# Patient Record
Sex: FEMALE | Race: BLACK OR AFRICAN AMERICAN | NOT HISPANIC OR LATINO | URBAN - METROPOLITAN AREA
[De-identification: names, ages, dates, MRNs, and addresses within clinical notes are randomized per-mention and may not be internally consistent; named-entity substitution may affect disease eponyms.]

---

## 2021-01-13 ENCOUNTER — EMERGENCY (EMERGENCY)
Facility: HOSPITAL | Age: 41
LOS: 1 days | Discharge: HOME | End: 2021-01-13
Admitting: EMERGENCY MEDICINE
Payer: COMMERCIAL

## 2021-01-13 VITALS
DIASTOLIC BLOOD PRESSURE: 77 MMHG | HEART RATE: 96 BPM | RESPIRATION RATE: 16 BRPM | SYSTOLIC BLOOD PRESSURE: 119 MMHG | OXYGEN SATURATION: 95 % | TEMPERATURE: 98 F

## 2021-01-13 DIAGNOSIS — Z20.822 CONTACT WITH AND (SUSPECTED) EXPOSURE TO COVID-19: ICD-10-CM

## 2021-01-13 DIAGNOSIS — R09.81 NASAL CONGESTION: ICD-10-CM

## 2021-01-13 LAB — SARS-COV-2 RNA SPEC QL NAA+PROBE: SIGNIFICANT CHANGE UP

## 2021-01-13 PROCEDURE — U0003: CPT

## 2021-01-13 PROCEDURE — U0005: CPT

## 2021-01-13 PROCEDURE — 99283 EMERGENCY DEPT VISIT LOW MDM: CPT

## 2021-01-13 PROCEDURE — 99284 EMERGENCY DEPT VISIT MOD MDM: CPT

## 2021-01-13 NOTE — ED PROVIDER NOTE - PATIENT PORTAL LINK FT
You can access the FollowMyHealth Patient Portal offered by [facility] by registering at the following website: [email or web address]. By joining [employer]’s FollowMyHealth portal, you will also be able to view your health information using other applications (apps) compatible with our system.

## 2021-01-13 NOTE — ED PROVIDER NOTE - NS ED ROS FT
CONSTITUTIONAL:  No fever or chills, no bodyaches  HEENT:  No sore throat or headache, +nasal congestion  PULM:  No cough or shortness of breath  GI:  No diarrhea, no vomiting

## 2021-01-13 NOTE — ED PROVIDER NOTE - OBJECTIVE STATEMENT
Patient is presenting to the Emergency Department with a request to have COVID-19 testing. Pt has runny nose. Denies symptoms, including cough, shortness of breath, fever, chills, bodyaches or sore throat.

## 2021-03-25 ENCOUNTER — EMERGENCY (EMERGENCY)
Facility: HOSPITAL | Age: 41
LOS: 1 days | Discharge: HOME | End: 2021-03-25
Admitting: EMERGENCY MEDICINE
Payer: COMMERCIAL

## 2021-03-25 VITALS
OXYGEN SATURATION: 96 % | HEART RATE: 71 BPM | DIASTOLIC BLOOD PRESSURE: 84 MMHG | RESPIRATION RATE: 18 BRPM | TEMPERATURE: 98 F | SYSTOLIC BLOOD PRESSURE: 124 MMHG

## 2021-03-25 LAB — SARS-COV-2 RNA SPEC QL NAA+PROBE: SIGNIFICANT CHANGE UP

## 2021-03-25 PROCEDURE — 99284 EMERGENCY DEPT VISIT MOD MDM: CPT

## 2021-03-25 PROCEDURE — 99283 EMERGENCY DEPT VISIT LOW MDM: CPT

## 2021-03-25 PROCEDURE — U0003: CPT

## 2021-03-25 PROCEDURE — U0005: CPT

## 2021-03-25 NOTE — ED PROVIDER NOTE - CLINICAL SUMMARY MEDICAL DECISION MAKING FREE TEXT BOX
Patient is presenting to the Emergency Department and requesting a COVID-19 test.  states runny nose patient denies any other symptoms, has an unremarkable focused exam and looks well.  Nasopharyngeal PCR has been obtained and patient has been guided on how to obtain their results.  General COVID-19 discharge instructions have been given to the patient.

## 2021-03-25 NOTE — ED PROVIDER NOTE - NS ED ROS FT
CONSTITUTIONAL:  No fever or chills, no bodyaches  HEENT: runny nose. No sore throat or headache, no nasal congestion  PULM:  No cough or shortness of breath  GI:  No diarrhea, no vomiting

## 2021-03-25 NOTE — ED PROVIDER NOTE - PATIENT PORTAL LINK FT
You can access the FollowMyHealth Patient Portal offered by [facility] by registering at the following website: [email or web address]. By joining [employer]’s FollowMyHealth portal, you will also be able to view your health information using other applications (apps) compatible with our system.

## 2021-03-28 DIAGNOSIS — R09.89 OTHER SPECIFIED SYMPTOMS AND SIGNS INVOLVING THE CIRCULATORY AND RESPIRATORY SYSTEMS: ICD-10-CM

## 2021-03-28 DIAGNOSIS — Z20.822 CONTACT WITH AND (SUSPECTED) EXPOSURE TO COVID-19: ICD-10-CM

## 2023-08-01 ENCOUNTER — EMERGENCY (EMERGENCY)
Facility: HOSPITAL | Age: 43
LOS: 1 days | Discharge: HOME | End: 2023-08-01
Attending: EMERGENCY MEDICINE | Admitting: OBSTETRICS & GYNECOLOGY
Payer: COMMERCIAL

## 2023-08-01 VITALS
DIASTOLIC BLOOD PRESSURE: 82 MMHG | OXYGEN SATURATION: 100 % | SYSTOLIC BLOOD PRESSURE: 123 MMHG | RESPIRATION RATE: 18 BRPM | HEIGHT: 65 IN | HEART RATE: 81 BPM | TEMPERATURE: 98 F | WEIGHT: 147.93 LBS

## 2023-08-01 DIAGNOSIS — O36.8120 DECREASED FETAL MOVEMENTS, SECOND TRIMESTER, NOT APPLICABLE OR UNSPECIFIED: ICD-10-CM

## 2023-08-01 DIAGNOSIS — R10.9 UNSPECIFIED ABDOMINAL PAIN: ICD-10-CM

## 2023-08-01 DIAGNOSIS — Z3A.21 21 WEEKS GESTATION OF PREGNANCY: ICD-10-CM

## 2023-08-01 DIAGNOSIS — O09.522 SUPERVISION OF ELDERLY MULTIGRAVIDA, SECOND TRIMESTER: ICD-10-CM

## 2023-08-01 DIAGNOSIS — O99.891 OTHER SPECIFIED DISEASES AND CONDITIONS COMPLICATING PREGNANCY: ICD-10-CM

## 2023-08-01 LAB
ALBUMIN SERPL ELPH-MCNC: 3.1 G/DL — LOW (ref 3.3–5)
ALP SERPL-CCNC: 61 U/L — SIGNIFICANT CHANGE UP (ref 40–120)
ALT FLD-CCNC: 48 U/L — HIGH (ref 10–45)
AMYLASE P1 CFR SERPL: 84 U/L — SIGNIFICANT CHANGE UP (ref 25–125)
ANION GAP SERPL CALC-SCNC: 9 MMOL/L — SIGNIFICANT CHANGE UP (ref 5–17)
APPEARANCE UR: CLEAR — SIGNIFICANT CHANGE UP
AST SERPL-CCNC: 33 U/L — SIGNIFICANT CHANGE UP (ref 10–40)
BASOPHILS # BLD AUTO: 0.05 K/UL — SIGNIFICANT CHANGE UP (ref 0–0.2)
BASOPHILS NFR BLD AUTO: 0.4 % — SIGNIFICANT CHANGE UP (ref 0–2)
BILIRUB SERPL-MCNC: 0.2 MG/DL — SIGNIFICANT CHANGE UP (ref 0.2–1.2)
BILIRUB UR-MCNC: NEGATIVE — SIGNIFICANT CHANGE UP
BUN SERPL-MCNC: 7 MG/DL — SIGNIFICANT CHANGE UP (ref 7–23)
CALCIUM SERPL-MCNC: 8.6 MG/DL — SIGNIFICANT CHANGE UP (ref 8.4–10.5)
CHLORIDE SERPL-SCNC: 109 MMOL/L — HIGH (ref 96–108)
CO2 SERPL-SCNC: 23 MMOL/L — SIGNIFICANT CHANGE UP (ref 22–31)
COLOR SPEC: YELLOW — SIGNIFICANT CHANGE UP
CREAT ?TM UR-MCNC: 66 MG/DL — SIGNIFICANT CHANGE UP
CREAT SERPL-MCNC: 0.5 MG/DL — SIGNIFICANT CHANGE UP (ref 0.5–1.3)
DIFF PNL FLD: NEGATIVE — SIGNIFICANT CHANGE UP
EGFR: 119 ML/MIN/1.73M2 — SIGNIFICANT CHANGE UP
EOSINOPHIL # BLD AUTO: 0.12 K/UL — SIGNIFICANT CHANGE UP (ref 0–0.5)
EOSINOPHIL NFR BLD AUTO: 1.1 % — SIGNIFICANT CHANGE UP (ref 0–6)
GLUCOSE SERPL-MCNC: 73 MG/DL — SIGNIFICANT CHANGE UP (ref 70–99)
GLUCOSE UR QL: NEGATIVE — SIGNIFICANT CHANGE UP
HCT VFR BLD CALC: 28.9 % — LOW (ref 34.5–45)
HGB BLD-MCNC: 9.8 G/DL — LOW (ref 11.5–15.5)
IMM GRANULOCYTES NFR BLD AUTO: 1.1 % — HIGH (ref 0–0.9)
KETONES UR-MCNC: NEGATIVE — SIGNIFICANT CHANGE UP
LDH SERPL L TO P-CCNC: 162 U/L — SIGNIFICANT CHANGE UP (ref 50–242)
LEUKOCYTE ESTERASE UR-ACNC: NEGATIVE — SIGNIFICANT CHANGE UP
LIDOCAIN IGE QN: 22 U/L — SIGNIFICANT CHANGE UP (ref 7–60)
LYMPHOCYTES # BLD AUTO: 1.92 K/UL — SIGNIFICANT CHANGE UP (ref 1–3.3)
LYMPHOCYTES # BLD AUTO: 17 % — SIGNIFICANT CHANGE UP (ref 13–44)
MCHC RBC-ENTMCNC: 33.2 PG — SIGNIFICANT CHANGE UP (ref 27–34)
MCHC RBC-ENTMCNC: 33.9 GM/DL — SIGNIFICANT CHANGE UP (ref 32–36)
MCV RBC AUTO: 98 FL — SIGNIFICANT CHANGE UP (ref 80–100)
MONOCYTES # BLD AUTO: 0.87 K/UL — SIGNIFICANT CHANGE UP (ref 0–0.9)
MONOCYTES NFR BLD AUTO: 7.7 % — SIGNIFICANT CHANGE UP (ref 2–14)
NEUTROPHILS # BLD AUTO: 8.23 K/UL — HIGH (ref 1.8–7.4)
NEUTROPHILS NFR BLD AUTO: 72.7 % — SIGNIFICANT CHANGE UP (ref 43–77)
NITRITE UR-MCNC: NEGATIVE — SIGNIFICANT CHANGE UP
NRBC # BLD: 0 /100 WBCS — SIGNIFICANT CHANGE UP (ref 0–0)
PH UR: 7 — SIGNIFICANT CHANGE UP (ref 5–8)
PLATELET # BLD AUTO: 310 K/UL — SIGNIFICANT CHANGE UP (ref 150–400)
POTASSIUM SERPL-MCNC: 3.4 MMOL/L — LOW (ref 3.5–5.3)
POTASSIUM SERPL-SCNC: 3.4 MMOL/L — LOW (ref 3.5–5.3)
PROT ?TM UR-MCNC: 8 MG/DL — SIGNIFICANT CHANGE UP (ref 0–12)
PROT SERPL-MCNC: 6.3 G/DL — SIGNIFICANT CHANGE UP (ref 6–8.3)
PROT UR-MCNC: NEGATIVE MG/DL — SIGNIFICANT CHANGE UP
PROT/CREAT UR-RTO: 0.1 RATIO — SIGNIFICANT CHANGE UP (ref 0–0.2)
RBC # BLD: 2.95 M/UL — LOW (ref 3.8–5.2)
RBC # FLD: 15 % — HIGH (ref 10.3–14.5)
SODIUM SERPL-SCNC: 141 MMOL/L — SIGNIFICANT CHANGE UP (ref 135–145)
SP GR SPEC: 1.02 — SIGNIFICANT CHANGE UP (ref 1–1.03)
URATE SERPL-MCNC: 2.5 MG/DL — SIGNIFICANT CHANGE UP (ref 2.5–7)
UROBILINOGEN FLD QL: 0.2 E.U./DL — SIGNIFICANT CHANGE UP
WBC # BLD: 11.31 K/UL — HIGH (ref 3.8–10.5)
WBC # FLD AUTO: 11.31 K/UL — HIGH (ref 3.8–10.5)

## 2023-08-01 PROCEDURE — 36415 COLL VENOUS BLD VENIPUNCTURE: CPT

## 2023-08-01 PROCEDURE — 76705 ECHO EXAM OF ABDOMEN: CPT

## 2023-08-01 PROCEDURE — 84550 ASSAY OF BLOOD/URIC ACID: CPT

## 2023-08-01 PROCEDURE — 82570 ASSAY OF URINE CREATININE: CPT

## 2023-08-01 PROCEDURE — 83690 ASSAY OF LIPASE: CPT

## 2023-08-01 PROCEDURE — 81003 URINALYSIS AUTO W/O SCOPE: CPT

## 2023-08-01 PROCEDURE — 83615 LACTATE (LD) (LDH) ENZYME: CPT

## 2023-08-01 PROCEDURE — 82150 ASSAY OF AMYLASE: CPT

## 2023-08-01 PROCEDURE — 85025 COMPLETE CBC W/AUTO DIFF WBC: CPT

## 2023-08-01 PROCEDURE — 99285 EMERGENCY DEPT VISIT HI MDM: CPT

## 2023-08-01 PROCEDURE — 76705 ECHO EXAM OF ABDOMEN: CPT | Mod: 26

## 2023-08-01 PROCEDURE — 80053 COMPREHEN METABOLIC PANEL: CPT

## 2023-08-01 PROCEDURE — 99285 EMERGENCY DEPT VISIT HI MDM: CPT | Mod: 25

## 2023-08-01 PROCEDURE — 84156 ASSAY OF PROTEIN URINE: CPT

## 2023-08-01 RX ORDER — ACETAMINOPHEN 500 MG
1000 TABLET ORAL ONCE
Refills: 0 | Status: DISCONTINUED | OUTPATIENT
Start: 2023-08-01 | End: 2023-08-01

## 2023-08-01 NOTE — ED PROVIDER NOTE - CLINICAL SUMMARY MEDICAL DECISION MAKING FREE TEXT BOX
43 F  @ 21 weeks by dates, due date Dec 15th, no with decreased fetal movement and intermittent R sided nonradiating sharp crampy abdominal pains feels like contractions.  Denies n/v, hematuria, flank pain, fever, chills, dysuria.  Spoke with OB  - recommend admit for further eval in OB.

## 2023-08-01 NOTE — ED PROVIDER NOTE - OBJECTIVE STATEMENT
43 F  @ 21 weeks by dates, due date Dec 15th, no with decreased fetal movement and intermittent R sided nonradiating sharp crampy abdominal pains feels like contractions.  Denies n/v, hematuria, flank pain, fever, chills, dysuria.

## 2023-12-04 ENCOUNTER — INPATIENT (INPATIENT)
Facility: HOSPITAL | Age: 43
LOS: 3 days | Discharge: HOME | End: 2023-12-08
Attending: OBSTETRICS & GYNECOLOGY | Admitting: OBSTETRICS & GYNECOLOGY
Payer: COMMERCIAL

## 2023-12-04 VITALS
RESPIRATION RATE: 16 BRPM | DIASTOLIC BLOOD PRESSURE: 74 MMHG | OXYGEN SATURATION: 99 % | TEMPERATURE: 98 F | SYSTOLIC BLOOD PRESSURE: 111 MMHG | HEART RATE: 83 BPM

## 2023-12-04 DIAGNOSIS — O26.899 OTHER SPECIFIED PREGNANCY RELATED CONDITIONS, UNSPECIFIED TRIMESTER: ICD-10-CM

## 2023-12-04 DIAGNOSIS — Z98.890 OTHER SPECIFIED POSTPROCEDURAL STATES: Chronic | ICD-10-CM

## 2023-12-04 LAB
BASOPHILS # BLD AUTO: 0.06 K/UL — SIGNIFICANT CHANGE UP (ref 0–0.2)
BASOPHILS # BLD AUTO: 0.06 K/UL — SIGNIFICANT CHANGE UP (ref 0–0.2)
BASOPHILS NFR BLD AUTO: 0.5 % — SIGNIFICANT CHANGE UP (ref 0–2)
BASOPHILS NFR BLD AUTO: 0.5 % — SIGNIFICANT CHANGE UP (ref 0–2)
BLD GP AB SCN SERPL QL: NEGATIVE — SIGNIFICANT CHANGE UP
BLD GP AB SCN SERPL QL: NEGATIVE — SIGNIFICANT CHANGE UP
EOSINOPHIL # BLD AUTO: 0.12 K/UL — SIGNIFICANT CHANGE UP (ref 0–0.5)
EOSINOPHIL # BLD AUTO: 0.12 K/UL — SIGNIFICANT CHANGE UP (ref 0–0.5)
EOSINOPHIL NFR BLD AUTO: 1 % — SIGNIFICANT CHANGE UP (ref 0–6)
EOSINOPHIL NFR BLD AUTO: 1 % — SIGNIFICANT CHANGE UP (ref 0–6)
HCT VFR BLD CALC: 31.6 % — LOW (ref 34.5–45)
HCT VFR BLD CALC: 31.6 % — LOW (ref 34.5–45)
HGB BLD-MCNC: 10.8 G/DL — LOW (ref 11.5–15.5)
HGB BLD-MCNC: 10.8 G/DL — LOW (ref 11.5–15.5)
IMM GRANULOCYTES NFR BLD AUTO: 1.2 % — HIGH (ref 0–0.9)
IMM GRANULOCYTES NFR BLD AUTO: 1.2 % — HIGH (ref 0–0.9)
LYMPHOCYTES # BLD AUTO: 19.7 % — SIGNIFICANT CHANGE UP (ref 13–44)
LYMPHOCYTES # BLD AUTO: 19.7 % — SIGNIFICANT CHANGE UP (ref 13–44)
LYMPHOCYTES # BLD AUTO: 2.27 K/UL — SIGNIFICANT CHANGE UP (ref 1–3.3)
LYMPHOCYTES # BLD AUTO: 2.27 K/UL — SIGNIFICANT CHANGE UP (ref 1–3.3)
MCHC RBC-ENTMCNC: 33.4 PG — SIGNIFICANT CHANGE UP (ref 27–34)
MCHC RBC-ENTMCNC: 33.4 PG — SIGNIFICANT CHANGE UP (ref 27–34)
MCHC RBC-ENTMCNC: 34.2 GM/DL — SIGNIFICANT CHANGE UP (ref 32–36)
MCHC RBC-ENTMCNC: 34.2 GM/DL — SIGNIFICANT CHANGE UP (ref 32–36)
MCV RBC AUTO: 97.8 FL — SIGNIFICANT CHANGE UP (ref 80–100)
MCV RBC AUTO: 97.8 FL — SIGNIFICANT CHANGE UP (ref 80–100)
MONOCYTES # BLD AUTO: 1.02 K/UL — HIGH (ref 0–0.9)
MONOCYTES # BLD AUTO: 1.02 K/UL — HIGH (ref 0–0.9)
MONOCYTES NFR BLD AUTO: 8.8 % — SIGNIFICANT CHANGE UP (ref 2–14)
MONOCYTES NFR BLD AUTO: 8.8 % — SIGNIFICANT CHANGE UP (ref 2–14)
NEUTROPHILS # BLD AUTO: 7.94 K/UL — HIGH (ref 1.8–7.4)
NEUTROPHILS # BLD AUTO: 7.94 K/UL — HIGH (ref 1.8–7.4)
NEUTROPHILS NFR BLD AUTO: 68.8 % — SIGNIFICANT CHANGE UP (ref 43–77)
NEUTROPHILS NFR BLD AUTO: 68.8 % — SIGNIFICANT CHANGE UP (ref 43–77)
NRBC # BLD: 0 /100 WBCS — SIGNIFICANT CHANGE UP (ref 0–0)
NRBC # BLD: 0 /100 WBCS — SIGNIFICANT CHANGE UP (ref 0–0)
PLATELET # BLD AUTO: 276 K/UL — SIGNIFICANT CHANGE UP (ref 150–400)
PLATELET # BLD AUTO: 276 K/UL — SIGNIFICANT CHANGE UP (ref 150–400)
RBC # BLD: 3.23 M/UL — LOW (ref 3.8–5.2)
RBC # BLD: 3.23 M/UL — LOW (ref 3.8–5.2)
RBC # FLD: 13.6 % — SIGNIFICANT CHANGE UP (ref 10.3–14.5)
RBC # FLD: 13.6 % — SIGNIFICANT CHANGE UP (ref 10.3–14.5)
RH IG SCN BLD-IMP: POSITIVE — SIGNIFICANT CHANGE UP
WBC # BLD: 11.55 K/UL — HIGH (ref 3.8–10.5)
WBC # BLD: 11.55 K/UL — HIGH (ref 3.8–10.5)
WBC # FLD AUTO: 11.55 K/UL — HIGH (ref 3.8–10.5)
WBC # FLD AUTO: 11.55 K/UL — HIGH (ref 3.8–10.5)

## 2023-12-04 PROCEDURE — 88307 TISSUE EXAM BY PATHOLOGIST: CPT | Mod: 26

## 2023-12-04 RX ORDER — SODIUM CHLORIDE 9 MG/ML
1000 INJECTION, SOLUTION INTRAVENOUS ONCE
Refills: 0 | Status: COMPLETED | OUTPATIENT
Start: 2023-12-04 | End: 2023-12-04

## 2023-12-04 RX ORDER — OXYTOCIN 10 UNIT/ML
333.33 VIAL (ML) INJECTION
Qty: 20 | Refills: 0 | Status: DISCONTINUED | OUTPATIENT
Start: 2023-12-04 | End: 2023-12-05

## 2023-12-04 RX ORDER — INFLUENZA VIRUS VACCINE 15; 15; 15; 15 UG/.5ML; UG/.5ML; UG/.5ML; UG/.5ML
0.5 SUSPENSION INTRAMUSCULAR ONCE
Refills: 0 | Status: DISCONTINUED | OUTPATIENT
Start: 2023-12-04 | End: 2023-12-08

## 2023-12-04 RX ORDER — FAMOTIDINE 10 MG/ML
20 INJECTION INTRAVENOUS ONCE
Refills: 0 | Status: COMPLETED | OUTPATIENT
Start: 2023-12-04 | End: 2023-12-04

## 2023-12-04 RX ORDER — AZITHROMYCIN 500 MG/1
500 TABLET, FILM COATED ORAL ONCE
Refills: 0 | Status: COMPLETED | OUTPATIENT
Start: 2023-12-04 | End: 2023-12-04

## 2023-12-04 RX ORDER — CEFAZOLIN SODIUM 1 G
2000 VIAL (EA) INJECTION ONCE
Refills: 0 | Status: COMPLETED | OUTPATIENT
Start: 2023-12-04 | End: 2023-12-04

## 2023-12-04 RX ORDER — SODIUM CHLORIDE 9 MG/ML
1000 INJECTION, SOLUTION INTRAVENOUS
Refills: 0 | Status: DISCONTINUED | OUTPATIENT
Start: 2023-12-04 | End: 2023-12-05

## 2023-12-04 RX ORDER — CITRIC ACID/SODIUM CITRATE 300-500 MG
30 SOLUTION, ORAL ORAL ONCE
Refills: 0 | Status: COMPLETED | OUTPATIENT
Start: 2023-12-04 | End: 2023-12-04

## 2023-12-04 RX ADMIN — Medication 30 MILLILITER(S): at 22:19

## 2023-12-04 RX ADMIN — FAMOTIDINE 20 MILLIGRAM(S): 10 INJECTION INTRAVENOUS at 22:19

## 2023-12-04 RX ADMIN — AZITHROMYCIN 255 MILLIGRAM(S): 500 TABLET, FILM COATED ORAL at 22:35

## 2023-12-04 RX ADMIN — SODIUM CHLORIDE 2000 MILLILITER(S): 9 INJECTION, SOLUTION INTRAVENOUS at 22:35

## 2023-12-04 NOTE — OB RN PATIENT PROFILE - PRO FEEDING PLAN INFANT OB
Addended by: RUSTY HAWTHORNE on: 8/14/2019 10:12 AM     Modules accepted: Orders     initiation of breastfeeding/breast milk feeding

## 2023-12-04 NOTE — OB PROVIDER H&P - HISTORY OF PRESENT ILLNESS
43y  at 38w4d presents for PROM at 17:00, clear fluid. She started having contractions after, describing them as flutters. Of note she is for a repeat c/s in 4 days. Denies vaginal bleeding. Endorses fetal movement. Patient currently being treated for trichomonas.

## 2023-12-04 NOTE — OB RN TRIAGE NOTE - FALL HARM RISK - UNIVERSAL INTERVENTIONS
Bed in lowest position, wheels locked, appropriate side rails in place/Call bell, personal items and telephone in reach/Instruct patient to call for assistance before getting out of bed or chair/Non-slip footwear when patient is out of bed/[place] to call system/Physically safe environment - no spills, clutter or unnecessary equipment/Purposeful Proactive Rounding/Room/bathroom lighting operational, light cord in reach Bed in lowest position, wheels locked, appropriate side rails in place/Call bell, personal items and telephone in reach/Instruct patient to call for assistance before getting out of bed or chair/Non-slip footwear when patient is out of bed/[place] to call system/Physically safe environment - no spills, clutter or unnecessary equipment/Purposeful Proactive Rounding/Room/bathroom lighting operational, light cord in reach

## 2023-12-04 NOTE — PRE-ANESTHESIA EVALUATION ADULT - NSANTHPMHFT_GEN_ALL_CORE
Additional PSxHx:  Section 2/2 Arrest of Dilation  under epidural no complication    OB Hx:  with IUP 38 week 3 day in Labor             G1-  2/2 Arrest of Dilation             G2-now

## 2023-12-04 NOTE — OB RN PATIENT PROFILE - FALL HARM RISK - UNIVERSAL INTERVENTIONS
Bed in lowest position, wheels locked, appropriate side rails in place/Call bell, personal items and telephone in reach/Instruct patient to call for assistance before getting out of bed or chair/Non-slip footwear when patient is out of bed/[place] to call system/Physically safe environment - no spills, clutter or unnecessary equipment/Purposeful Proactive Rounding/Room/bathroom lighting operational, light cord in reach Bed in lowest position, wheels locked, appropriate side rails in place/Call bell, personal items and telephone in reach/Instruct patient to call for assistance before getting out of bed or chair/Non-slip footwear when patient is out of bed/[place] to call system/Physically safe environment - no spills, clutter or unnecessary equipment/Purposeful Proactive Rounding/Room/bathroom lighting operational, light cord in reach

## 2023-12-04 NOTE — OB RN INTRAOPERATIVE NOTE - NS_DURAMORPH_OBGYN_ALL_OB
H&P Update: Shahid Linton was seen and examined. History and physical has been reviewed. The patient has been examined.  There have been no significant clinical changes since the completion of the originally dated History and Physical.    Signed By: Tristian No MD     July 13, 2018 11:35 AM Yes

## 2023-12-04 NOTE — OB PROVIDER H&P - NS_OBGYNHISTORY_OBGYN_ALL_OB_FT
Ante:  -spontaneous pregnancy  -NIPT/anatomy wnl  -GCT wnl  -no hypertensive disorders of pregnancy  -GBS negative  -EFW 3200g    Ob Hx: G1 2014 pC/S for arrest of dilation, G2 current    Gyn Hx: currently being treated for trichomonas, unsure how she got it    PMHx: hypothyroidism     PSHx: primary c/s, lymph node dissection on left neck    Meds: synthroid 150 mcg    Allergies: NKDA    Physical Exam:  T(C): 36.5 (12-04-23 @ 20:34), Max: 36.5 (12-04-23 @ 20:34)  HR: 83 (12-04-23 @ 20:34) (83 - 83)  BP: 111/74 (12-04-23 @ 20:34) (111/74 - 111/74)  RR: 16 (12-04-23 @ 20:34) (16 - 16)  SpO2: 99% (12-04-23 @ 20:34) (99% - 99%)  General: comfortable appearing  Abdomen: soft, non-tender, gravid  TAUS: cephalic presentation  SVE: fingertip/long    FHT: 130 baseline, moderate variability, +accels, no decels  [place]: ctx q 5-10 minutes  Category I Ante:  -spontaneous pregnancy  -NIPT/anatomy wnl  -GCT wnl  -no hypertensive disorders of pregnancy  -GBS negative  -EFW 3200g    Ob Hx: G1 2014 pC/S for arrest of dilation, G2 current    Gyn Hx: currently being treated for trichomonas, unsure how she got it    PMHx: hypothyroidism     PSHx: primary c/s, lymph node dissection on left neck    Meds: synthroid 150 mcg    Allergies: NKDA    Physical Exam:  T(C): 36.5 (12-04-23 @ 20:34), Max: 36.5 (12-04-23 @ 20:34)  HR: 83 (12-04-23 @ 20:34) (83 - 83)  BP: 111/74 (12-04-23 @ 20:34) (111/74 - 111/74)  RR: 16 (12-04-23 @ 20:34) (16 - 16)  SpO2: 99% (12-04-23 @ 20:34) (99% - 99%)  General: comfortable appearing  Abdomen: soft, non-tender, gravid  TAUS: cephalic presentation  SVE: fingertip/long    FHT: 130 baseline, moderate variability, +accels, no decels  [place]: ctx q 5-10 minutes  Category I

## 2023-12-04 NOTE — PRE-ANESTHESIA EVALUATION ADULT - NSANTHADDINFOFT_GEN_ALL_CORE
Discussed with OB Attending. We would like to wait until 23;30 for the full 8 hour NPO protocol to complete.  OB Attending states contractions are getting stronger so she cannot wait.  Will proceed

## 2023-12-04 NOTE — OB PROVIDER H&P - NSMATERNALFETALCONCERNS_OBGYN_ALL_OB_FT
contractions. patients feeling them increasing in frequency and intensity. They are now like bad menstrual cramps and she rates them 5/10. due to increasing signs of labor and a previous section will proceed with section at this time. anesthesia and nursing notified

## 2023-12-04 NOTE — OB PROVIDER H&P - ASSESSMENT
A/P  43y  at 38w4d presents in early labor, PROM for repeat c/s  -admit to labor and delivery  -repeat c/s  -ancef ordered  -anesthesia consulted  -consents signed, all questions answered, patient expressed understanding  -discussed with senior resident Dr. Espinoza  -discussed with attending Dr. Vale         Pt currently has trichomoniasis

## 2023-12-05 LAB
T PALLIDUM AB TITR SER: NEGATIVE — SIGNIFICANT CHANGE UP
T PALLIDUM AB TITR SER: NEGATIVE — SIGNIFICANT CHANGE UP

## 2023-12-05 RX ORDER — TETANUS TOXOID, REDUCED DIPHTHERIA TOXOID AND ACELLULAR PERTUSSIS VACCINE, ADSORBED 5; 2.5; 8; 8; 2.5 [IU]/.5ML; [IU]/.5ML; UG/.5ML; UG/.5ML; UG/.5ML
0.5 SUSPENSION INTRAMUSCULAR ONCE
Refills: 0 | Status: COMPLETED | OUTPATIENT
Start: 2023-12-05

## 2023-12-05 RX ORDER — DEXAMETHASONE 0.5 MG/5ML
4 ELIXIR ORAL EVERY 6 HOURS
Refills: 0 | Status: DISCONTINUED | OUTPATIENT
Start: 2023-12-05 | End: 2023-12-08

## 2023-12-05 RX ORDER — OXYCODONE HYDROCHLORIDE 5 MG/1
5 TABLET ORAL
Refills: 0 | Status: COMPLETED | OUTPATIENT
Start: 2023-12-05 | End: 2023-12-12

## 2023-12-05 RX ORDER — LEVOTHYROXINE SODIUM 125 MCG
75 TABLET ORAL DAILY
Refills: 0 | Status: DISCONTINUED | OUTPATIENT
Start: 2023-12-05 | End: 2023-12-05

## 2023-12-05 RX ORDER — DIPHENHYDRAMINE HCL 50 MG
25 CAPSULE ORAL EVERY 6 HOURS
Refills: 0 | Status: COMPLETED | OUTPATIENT
Start: 2023-12-05 | End: 2024-11-02

## 2023-12-05 RX ORDER — ONDANSETRON 8 MG/1
4 TABLET, FILM COATED ORAL EVERY 6 HOURS
Refills: 0 | Status: DISCONTINUED | OUTPATIENT
Start: 2023-12-05 | End: 2023-12-08

## 2023-12-05 RX ORDER — SODIUM CHLORIDE 9 MG/ML
1000 INJECTION, SOLUTION INTRAVENOUS
Refills: 0 | Status: DISCONTINUED | OUTPATIENT
Start: 2023-12-05 | End: 2023-12-08

## 2023-12-05 RX ORDER — MAGNESIUM HYDROXIDE 400 MG/1
30 TABLET, CHEWABLE ORAL
Refills: 0 | Status: DISCONTINUED | OUTPATIENT
Start: 2023-12-05 | End: 2023-12-08

## 2023-12-05 RX ORDER — ACETAMINOPHEN 500 MG
975 TABLET ORAL
Refills: 0 | Status: DISCONTINUED | OUTPATIENT
Start: 2023-12-05 | End: 2023-12-08

## 2023-12-05 RX ORDER — NALOXONE HYDROCHLORIDE 4 MG/.1ML
0.1 SPRAY NASAL
Refills: 0 | Status: DISCONTINUED | OUTPATIENT
Start: 2023-12-05 | End: 2023-12-08

## 2023-12-05 RX ORDER — LEVOTHYROXINE SODIUM 125 MCG
150 TABLET ORAL DAILY
Refills: 0 | Status: DISCONTINUED | OUTPATIENT
Start: 2023-12-05 | End: 2023-12-08

## 2023-12-05 RX ORDER — SODIUM CHLORIDE 0.65 %
1 AEROSOL, SPRAY (ML) NASAL DAILY
Refills: 0 | Status: DISCONTINUED | OUTPATIENT
Start: 2023-12-05 | End: 2023-12-08

## 2023-12-05 RX ORDER — LANOLIN
1 OINTMENT (GRAM) TOPICAL EVERY 6 HOURS
Refills: 0 | Status: DISCONTINUED | OUTPATIENT
Start: 2023-12-05 | End: 2023-12-08

## 2023-12-05 RX ORDER — METRONIDAZOLE 500 MG
500 TABLET ORAL EVERY 12 HOURS
Refills: 0 | Status: DISCONTINUED | OUTPATIENT
Start: 2023-12-05 | End: 2023-12-08

## 2023-12-05 RX ORDER — KETOROLAC TROMETHAMINE 30 MG/ML
30 SYRINGE (ML) INJECTION EVERY 6 HOURS
Refills: 0 | Status: DISCONTINUED | OUTPATIENT
Start: 2023-12-05 | End: 2023-12-06

## 2023-12-05 RX ORDER — DIPHENHYDRAMINE HCL 50 MG
25 CAPSULE ORAL EVERY 6 HOURS
Refills: 0 | Status: DISCONTINUED | OUTPATIENT
Start: 2023-12-05 | End: 2023-12-08

## 2023-12-05 RX ORDER — ACETAMINOPHEN 500 MG
1000 TABLET ORAL ONCE
Refills: 0 | Status: COMPLETED | OUTPATIENT
Start: 2023-12-05 | End: 2023-12-05

## 2023-12-05 RX ORDER — OXYTOCIN 10 UNIT/ML
333.33 VIAL (ML) INJECTION
Qty: 20 | Refills: 0 | Status: DISCONTINUED | OUTPATIENT
Start: 2023-12-05 | End: 2023-12-08

## 2023-12-05 RX ORDER — OXYCODONE HYDROCHLORIDE 5 MG/1
5 TABLET ORAL ONCE
Refills: 0 | Status: DISCONTINUED | OUTPATIENT
Start: 2023-12-05 | End: 2023-12-08

## 2023-12-05 RX ORDER — IBUPROFEN 200 MG
600 TABLET ORAL EVERY 6 HOURS
Refills: 0 | Status: COMPLETED | OUTPATIENT
Start: 2023-12-05 | End: 2024-11-02

## 2023-12-05 RX ORDER — ENOXAPARIN SODIUM 100 MG/ML
40 INJECTION SUBCUTANEOUS EVERY 24 HOURS
Refills: 0 | Status: DISCONTINUED | OUTPATIENT
Start: 2023-12-05 | End: 2023-12-08

## 2023-12-05 RX ORDER — MORPHINE SULFATE 50 MG/1
0.15 CAPSULE, EXTENDED RELEASE ORAL ONCE
Refills: 0 | Status: DISCONTINUED | OUTPATIENT
Start: 2023-12-05 | End: 2023-12-08

## 2023-12-05 RX ORDER — SIMETHICONE 80 MG/1
80 TABLET, CHEWABLE ORAL EVERY 4 HOURS
Refills: 0 | Status: DISCONTINUED | OUTPATIENT
Start: 2023-12-05 | End: 2023-12-08

## 2023-12-05 RX ADMIN — Medication 25 MILLIGRAM(S): at 12:00

## 2023-12-05 RX ADMIN — Medication 150 MICROGRAM(S): at 06:01

## 2023-12-05 RX ADMIN — Medication 25 MILLIGRAM(S): at 17:37

## 2023-12-05 RX ADMIN — SODIUM CHLORIDE 125 MILLILITER(S): 9 INJECTION, SOLUTION INTRAVENOUS at 07:02

## 2023-12-05 RX ADMIN — ENOXAPARIN SODIUM 40 MILLIGRAM(S): 100 INJECTION SUBCUTANEOUS at 12:00

## 2023-12-05 RX ADMIN — Medication 975 MILLIGRAM(S): at 20:33

## 2023-12-05 RX ADMIN — Medication 500 MILLIGRAM(S): at 17:37

## 2023-12-05 RX ADMIN — Medication 30 MILLIGRAM(S): at 06:01

## 2023-12-05 RX ADMIN — Medication 30 MILLIGRAM(S): at 12:00

## 2023-12-05 RX ADMIN — Medication 400 MILLIGRAM(S): at 02:33

## 2023-12-05 RX ADMIN — Medication 975 MILLIGRAM(S): at 21:33

## 2023-12-05 RX ADMIN — Medication 30 MILLIGRAM(S): at 17:37

## 2023-12-05 RX ADMIN — Medication 25 MILLIGRAM(S): at 05:12

## 2023-12-05 RX ADMIN — Medication 500 MILLIGRAM(S): at 06:00

## 2023-12-05 RX ADMIN — Medication 975 MILLIGRAM(S): at 15:22

## 2023-12-05 RX ADMIN — Medication 1 SPRAY(S): at 23:30

## 2023-12-05 RX ADMIN — Medication 975 MILLIGRAM(S): at 09:04

## 2023-12-05 NOTE — OB PROVIDER DELIVERY SUMMARY - NSPROVIDERDELIVERYNOTE_OBGYN_ALL_OB_FT
44 yo  s/p repeat c/s for PROM and early labor. A viable male infant delivered and placenta. Placenta sent to pathology. Uterus closed in one layer sepra film over hysterotomy site. Muscle closed with chromic suture. Fascia closed with 0 vicryl. Subq closed with monocryl. Skin closed with 3-0 monocryl on keeth needle. Excellent hemostasis.    IVF 1500   44 yo  s/p repeat c/s for PROM and early labor. A viable male infant delivered and placenta. Placenta sent to pathology. Uterus closed in one layer with #1chromic suture, sepra film over hysterotomy site. Muscle closed with 1 chromic suture. Fascia closed with 0 vicryl. Subq closed with 2-0 monocryl. Skin closed with 3-0 monocryl on keeth needle. Excellent hemostasis.  Patient and infant in stable conditiong. Dictation number: [id number]    IVF 1500   44 yo  s/p repeat c/s for PROM and early labor. A viable male infant delivered and placenta. Placenta sent to pathology. Uterus closed in one layer with #1chromic suture, sepra film over hysterotomy site. Muscle closed with 1 chromic suture. Fascia closed with 0 vicryl. Subq closed with 2-0 monocryl. Skin closed with 3-0 monocryl on keeth needle. Excellent hemostasis.  Patient and infant in stable conditiong. Dictation number: [id number]    IVF 1500

## 2023-12-05 NOTE — PROVIDER CONTACT NOTE (OTHER) - ASSESSMENT
Pt stated when she went to the bathroom by herself, the pressure dressing "fell off on its own". Assessed steri strips, no drainage, no tenderness and no active bleeding. VS stable, fundus and lochia wdl.

## 2023-12-05 NOTE — LACTATION INITIAL EVALUATION - NS LACT CON REASON FOR REQ
Mother's feeding plan is mostly bottle feeding with formula, some breastfeeding.  Mother did not breastfeed her first child.  Baby has only been bottle fed up until this point.  Positioning and latch strategies were taught, no expressible colostrum at this time.  I encouraged feeding baby on demand, in response to identified feeding cues, and hand expression was also taught.  Baby has a strong suck on a gloved finger, but refuses to suck on the breast, despite various attempts/positions.  Mother will continue to bottle feed with formula, and I offered a breast pump to make up for any missed stimulation.  Parents understand that lactation is available for any further  needs for support, and telelactation referral was placed./multiparous mom

## 2023-12-05 NOTE — OB RN DELIVERY SUMMARY - NSSELHIDDEN_OBGYN_ALL_OB_FT
[NS_DeliveryAttending1_OBGYN_ALL_OB_FT:[id number]==],[NS_DeliveryAssist1_OBGYN_ALL_OB_FT:[id number]=],[NS_DeliveryRN_OBGYN_ALL_OB_FT:[id number]=],[NS_CirculateRN2_OBGYN_ALL_OB_FT:[id number]] [NS_DeliveryAttending1_OBGYN_ALL_OB_FT:[id number]==],[NS_DeliveryAssist1_OBGYN_ALL_OB_FT:[id number]=],[NS_DeliveryRN_OBGYN_ALL_OB_FT:[id number]=],[NS_CirculateRN2_OBGYN_ALL_OB_FT:[id number]=]

## 2023-12-05 NOTE — LACTATION INITIAL EVALUATION - LACTATION INTERVENTIONS
initiate/review safe skin-to-skin/initiate/review hand expression/initiate/review techniques for position and latch/post discharge community resources provided/review techniques to increase milk supply/reviewed components of an effective feeding and at least 8 effective feedings per day required/reviewed importance of monitoring infant diapers, the breastfeeding log, and minimum output each day/reviewed feeding on demand/by cue at least 8 times a day

## 2023-12-05 NOTE — OB PROVIDER DELIVERY SUMMARY - NSSELHIDDEN_OBGYN_ALL_OB_FT
[NS_DeliveryAttending1_OBGYN_ALL_OB_FT:[id number]==],[NS_DeliveryAssist1_OBGYN_ALL_OB_FT:[id number]=],[NS_DeliveryRN_OBGYN_ALL_OB_FT:[id number]=],[NS_CirculateRN2_OBGYN_ALL_OB_FT:[id number]] [NS_DeliveryAttending1_OBGYN_ALL_OB_FT:[id number]==],[NS_DeliveryAssist1_OBGYN_ALL_OB_FT:[id number]=],[NS_DeliveryRN_OBGYN_ALL_OB_FT:[id number]=],[NS_CirculateRN2_OBGYN_ALL_OB_FT:[id number]=]

## 2023-12-05 NOTE — OB RN DELIVERY SUMMARY - NS_SEPSISRSKCALC_OBGYN_ALL_OB_FT
EOS calculated successfully. EOS Risk Factor: 0.5/1000 live births ([facility] [place] national incidence[place] GA=38w3d; Temp=98.6; ROM=6.833; GBS='Negative'; Antibiotics='Broad spectrum antibiotics 2-3.9 hrs prior to birth'   EOS calculated successfully. EOS Risk Factor: 0.5/1000 live births ([facility] national incidence); GA=38w3d; Temp=98.6; ROM=6.833; GBS='Negative'; Antibiotics='Broad spectrum antibiotics 2-3.9 hrs prior to birth'

## 2023-12-06 LAB
BASOPHILS # BLD AUTO: 0.03 K/UL — SIGNIFICANT CHANGE UP (ref 0–0.2)
BASOPHILS # BLD AUTO: 0.03 K/UL — SIGNIFICANT CHANGE UP (ref 0–0.2)
BASOPHILS NFR BLD AUTO: 0.2 % — SIGNIFICANT CHANGE UP (ref 0–2)
BASOPHILS NFR BLD AUTO: 0.2 % — SIGNIFICANT CHANGE UP (ref 0–2)
EOSINOPHIL # BLD AUTO: 0.06 K/UL — SIGNIFICANT CHANGE UP (ref 0–0.5)
EOSINOPHIL # BLD AUTO: 0.06 K/UL — SIGNIFICANT CHANGE UP (ref 0–0.5)
EOSINOPHIL NFR BLD AUTO: 0.5 % — SIGNIFICANT CHANGE UP (ref 0–6)
EOSINOPHIL NFR BLD AUTO: 0.5 % — SIGNIFICANT CHANGE UP (ref 0–6)
HCT VFR BLD CALC: 27.2 % — LOW (ref 34.5–45)
HCT VFR BLD CALC: 27.2 % — LOW (ref 34.5–45)
HGB BLD-MCNC: 9.4 G/DL — LOW (ref 11.5–15.5)
HGB BLD-MCNC: 9.4 G/DL — LOW (ref 11.5–15.5)
IMM GRANULOCYTES NFR BLD AUTO: 1.1 % — HIGH (ref 0–0.9)
IMM GRANULOCYTES NFR BLD AUTO: 1.1 % — HIGH (ref 0–0.9)
LYMPHOCYTES # BLD AUTO: 16.9 % — SIGNIFICANT CHANGE UP (ref 13–44)
LYMPHOCYTES # BLD AUTO: 16.9 % — SIGNIFICANT CHANGE UP (ref 13–44)
LYMPHOCYTES # BLD AUTO: 2.12 K/UL — SIGNIFICANT CHANGE UP (ref 1–3.3)
LYMPHOCYTES # BLD AUTO: 2.12 K/UL — SIGNIFICANT CHANGE UP (ref 1–3.3)
MCHC RBC-ENTMCNC: 33.7 PG — SIGNIFICANT CHANGE UP (ref 27–34)
MCHC RBC-ENTMCNC: 33.7 PG — SIGNIFICANT CHANGE UP (ref 27–34)
MCHC RBC-ENTMCNC: 34.6 GM/DL — SIGNIFICANT CHANGE UP (ref 32–36)
MCHC RBC-ENTMCNC: 34.6 GM/DL — SIGNIFICANT CHANGE UP (ref 32–36)
MCV RBC AUTO: 97.5 FL — SIGNIFICANT CHANGE UP (ref 80–100)
MCV RBC AUTO: 97.5 FL — SIGNIFICANT CHANGE UP (ref 80–100)
MONOCYTES # BLD AUTO: 0.88 K/UL — SIGNIFICANT CHANGE UP (ref 0–0.9)
MONOCYTES # BLD AUTO: 0.88 K/UL — SIGNIFICANT CHANGE UP (ref 0–0.9)
MONOCYTES NFR BLD AUTO: 7 % — SIGNIFICANT CHANGE UP (ref 2–14)
MONOCYTES NFR BLD AUTO: 7 % — SIGNIFICANT CHANGE UP (ref 2–14)
NEUTROPHILS # BLD AUTO: 9.34 K/UL — HIGH (ref 1.8–7.4)
NEUTROPHILS # BLD AUTO: 9.34 K/UL — HIGH (ref 1.8–7.4)
NEUTROPHILS NFR BLD AUTO: 74.3 % — SIGNIFICANT CHANGE UP (ref 43–77)
NEUTROPHILS NFR BLD AUTO: 74.3 % — SIGNIFICANT CHANGE UP (ref 43–77)
NRBC # BLD: 0 /100 WBCS — SIGNIFICANT CHANGE UP (ref 0–0)
NRBC # BLD: 0 /100 WBCS — SIGNIFICANT CHANGE UP (ref 0–0)
PLATELET # BLD AUTO: 233 K/UL — SIGNIFICANT CHANGE UP (ref 150–400)
PLATELET # BLD AUTO: 233 K/UL — SIGNIFICANT CHANGE UP (ref 150–400)
RBC # BLD: 2.79 M/UL — LOW (ref 3.8–5.2)
RBC # BLD: 2.79 M/UL — LOW (ref 3.8–5.2)
RBC # FLD: 13.6 % — SIGNIFICANT CHANGE UP (ref 10.3–14.5)
RBC # FLD: 13.6 % — SIGNIFICANT CHANGE UP (ref 10.3–14.5)
WBC # BLD: 12.57 K/UL — HIGH (ref 3.8–10.5)
WBC # BLD: 12.57 K/UL — HIGH (ref 3.8–10.5)
WBC # FLD AUTO: 12.57 K/UL — HIGH (ref 3.8–10.5)
WBC # FLD AUTO: 12.57 K/UL — HIGH (ref 3.8–10.5)

## 2023-12-06 RX ORDER — OXYCODONE HYDROCHLORIDE 5 MG/1
5 TABLET ORAL
Refills: 0 | Status: DISCONTINUED | OUTPATIENT
Start: 2023-12-06 | End: 2023-12-08

## 2023-12-06 RX ORDER — IBUPROFEN 200 MG
600 TABLET ORAL EVERY 6 HOURS
Refills: 0 | Status: DISCONTINUED | OUTPATIENT
Start: 2023-12-06 | End: 2023-12-08

## 2023-12-06 RX ADMIN — Medication 975 MILLIGRAM(S): at 20:24

## 2023-12-06 RX ADMIN — Medication 500 MILLIGRAM(S): at 06:03

## 2023-12-06 RX ADMIN — OXYCODONE HYDROCHLORIDE 5 MILLIGRAM(S): 5 TABLET ORAL at 23:52

## 2023-12-06 RX ADMIN — Medication 600 MILLIGRAM(S): at 12:06

## 2023-12-06 RX ADMIN — Medication 500 MILLIGRAM(S): at 18:01

## 2023-12-06 RX ADMIN — Medication 600 MILLIGRAM(S): at 23:06

## 2023-12-06 RX ADMIN — SIMETHICONE 80 MILLIGRAM(S): 80 TABLET, CHEWABLE ORAL at 14:43

## 2023-12-06 RX ADMIN — OXYCODONE HYDROCHLORIDE 5 MILLIGRAM(S): 5 TABLET ORAL at 23:14

## 2023-12-06 RX ADMIN — Medication 975 MILLIGRAM(S): at 21:20

## 2023-12-06 RX ADMIN — Medication 150 MICROGRAM(S): at 06:03

## 2023-12-06 RX ADMIN — OXYCODONE HYDROCHLORIDE 5 MILLIGRAM(S): 5 TABLET ORAL at 06:50

## 2023-12-06 RX ADMIN — OXYCODONE HYDROCHLORIDE 5 MILLIGRAM(S): 5 TABLET ORAL at 06:22

## 2023-12-06 RX ADMIN — Medication 600 MILLIGRAM(S): at 18:00

## 2023-12-06 RX ADMIN — Medication 600 MILLIGRAM(S): at 06:03

## 2023-12-06 RX ADMIN — ENOXAPARIN SODIUM 40 MILLIGRAM(S): 100 INJECTION SUBCUTANEOUS at 12:06

## 2023-12-06 RX ADMIN — Medication 975 MILLIGRAM(S): at 14:42

## 2023-12-06 RX ADMIN — SIMETHICONE 80 MILLIGRAM(S): 80 TABLET, CHEWABLE ORAL at 08:22

## 2023-12-06 RX ADMIN — Medication 975 MILLIGRAM(S): at 08:22

## 2023-12-06 RX ADMIN — Medication 600 MILLIGRAM(S): at 23:52

## 2023-12-06 RX ADMIN — Medication 600 MILLIGRAM(S): at 06:50

## 2023-12-07 RX ORDER — TETANUS TOXOID, REDUCED DIPHTHERIA TOXOID AND ACELLULAR PERTUSSIS VACCINE, ADSORBED 5; 2.5; 8; 8; 2.5 [IU]/.5ML; [IU]/.5ML; UG/.5ML; UG/.5ML; UG/.5ML
0.5 SUSPENSION INTRAMUSCULAR ONCE
Refills: 0 | Status: COMPLETED | OUTPATIENT
Start: 2023-12-07 | End: 2023-12-07

## 2023-12-07 RX ADMIN — Medication 975 MILLIGRAM(S): at 15:58

## 2023-12-07 RX ADMIN — Medication 150 MICROGRAM(S): at 06:14

## 2023-12-07 RX ADMIN — Medication 500 MILLIGRAM(S): at 06:14

## 2023-12-07 RX ADMIN — Medication 600 MILLIGRAM(S): at 11:57

## 2023-12-07 RX ADMIN — Medication 975 MILLIGRAM(S): at 10:00

## 2023-12-07 RX ADMIN — Medication 975 MILLIGRAM(S): at 22:15

## 2023-12-07 RX ADMIN — Medication 975 MILLIGRAM(S): at 14:40

## 2023-12-07 RX ADMIN — Medication 600 MILLIGRAM(S): at 12:55

## 2023-12-07 RX ADMIN — ENOXAPARIN SODIUM 40 MILLIGRAM(S): 100 INJECTION SUBCUTANEOUS at 11:57

## 2023-12-07 RX ADMIN — Medication 975 MILLIGRAM(S): at 02:36

## 2023-12-07 RX ADMIN — Medication 600 MILLIGRAM(S): at 06:13

## 2023-12-07 RX ADMIN — TETANUS TOXOID, REDUCED DIPHTHERIA TOXOID AND ACELLULAR PERTUSSIS VACCINE, ADSORBED 0.5 MILLILITER(S): 5; 2.5; 8; 8; 2.5 SUSPENSION INTRAMUSCULAR at 18:42

## 2023-12-07 RX ADMIN — Medication 500 MILLIGRAM(S): at 17:56

## 2023-12-07 RX ADMIN — Medication 600 MILLIGRAM(S): at 23:46

## 2023-12-07 RX ADMIN — Medication 975 MILLIGRAM(S): at 21:31

## 2023-12-07 RX ADMIN — Medication 975 MILLIGRAM(S): at 09:00

## 2023-12-07 RX ADMIN — Medication 600 MILLIGRAM(S): at 17:56

## 2023-12-07 RX ADMIN — Medication 600 MILLIGRAM(S): at 18:59

## 2023-12-08 VITALS
SYSTOLIC BLOOD PRESSURE: 124 MMHG | DIASTOLIC BLOOD PRESSURE: 78 MMHG | RESPIRATION RATE: 18 BRPM | HEART RATE: 56 BPM | OXYGEN SATURATION: 100 % | TEMPERATURE: 97 F

## 2023-12-08 PROCEDURE — 86900 BLOOD TYPING SEROLOGIC ABO: CPT

## 2023-12-08 PROCEDURE — 86901 BLOOD TYPING SEROLOGIC RH(D): CPT

## 2023-12-08 PROCEDURE — 88307 TISSUE EXAM BY PATHOLOGIST: CPT

## 2023-12-08 PROCEDURE — 90715 TDAP VACCINE 7 YRS/> IM: CPT

## 2023-12-08 PROCEDURE — 85025 COMPLETE CBC W/AUTO DIFF WBC: CPT

## 2023-12-08 PROCEDURE — 86780 TREPONEMA PALLIDUM: CPT

## 2023-12-08 PROCEDURE — 36415 COLL VENOUS BLD VENIPUNCTURE: CPT

## 2023-12-08 PROCEDURE — 86850 RBC ANTIBODY SCREEN: CPT

## 2023-12-08 RX ORDER — IBUPROFEN 200 MG
1 TABLET ORAL
Qty: 0 | Refills: 0 | DISCHARGE
Start: 2023-12-08

## 2023-12-08 RX ORDER — ACETAMINOPHEN 500 MG
3 TABLET ORAL
Qty: 0 | Refills: 0 | DISCHARGE
Start: 2023-12-08

## 2023-12-08 RX ADMIN — Medication 500 MILLIGRAM(S): at 06:06

## 2023-12-08 RX ADMIN — Medication 600 MILLIGRAM(S): at 12:08

## 2023-12-08 RX ADMIN — Medication 600 MILLIGRAM(S): at 07:00

## 2023-12-08 RX ADMIN — Medication 600 MILLIGRAM(S): at 06:06

## 2023-12-08 RX ADMIN — Medication 150 MICROGRAM(S): at 06:12

## 2023-12-08 RX ADMIN — ENOXAPARIN SODIUM 40 MILLIGRAM(S): 100 INJECTION SUBCUTANEOUS at 12:12

## 2023-12-08 RX ADMIN — Medication 975 MILLIGRAM(S): at 08:52

## 2023-12-08 RX ADMIN — Medication 975 MILLIGRAM(S): at 03:45

## 2023-12-08 RX ADMIN — Medication 600 MILLIGRAM(S): at 00:30

## 2023-12-08 RX ADMIN — Medication 975 MILLIGRAM(S): at 15:39

## 2023-12-08 RX ADMIN — Medication 975 MILLIGRAM(S): at 02:46

## 2023-12-08 NOTE — DISCHARGE NOTE OB - CARE PLAN
Principal Discharge DX:	 delivery delivered  Assessment and plan of treatment:	 delivery, meeting all postoperative milestones.  Please follow-up with your OB doctor within 1-2 weeks.  You can resume a regular diet at home and may continue your prenatal vitamins as directed.  Please place nothing in the vagina for 6 weeks (no tampons, sex, douching, tub baths, swimming pools, etc).  If you have severe headaches and/or vision changes, heavy bleeding, or chest pain, please call your provider or go to the nearest Emergency Department.  Please call your OB with any signs of symptoms of infection including fever > 100.4 degrees, severe pain, malodorous vaginal discharge or heavy bleeding requiring more than 1-2 pads/hour.  You can take Motrin 600mg orally every 6 hours and Tylenol 1000mg orally every 6 hours for pain as needed.  Secondary Diagnosis:	Acute postoperative anemia due to expected blood loss   1

## 2023-12-08 NOTE — DISCHARGE NOTE OB - PATIENT PORTAL LINK FT
You can access the FollowMyHealth Patient Portal offered by [facility] by registering at the following website: [email or web address]. By joining [employer]’s FollowMyHealth portal, you will also be able to view your health information using other applications (apps) compatible with our system. You can access the FollowMyHealth Patient Portal offered by [facility] by registering at the following website: [email or web address]. By joining [employer]’s FollowMyHealth portal, you will also be able to view your health information using other applications (apps) compatible with our system.

## 2023-12-08 NOTE — PROGRESS NOTE ADULT - SUBJECTIVE AND OBJECTIVE BOX
Patient evaluated at bedside this morning, resting comfortable in bed, with no acute events overnight.  She reports pain is well controlled with oral pain medications.   She denies headache, dizziness, chest pain, palpitations, shortness of breath, nausea, vomiting or heavy vaginal bleeding.  She has not tried ambulating since procedure, galaviz remains in place at this time. Tolerating clear liquids.     Physical Exam:  Vital Signs Last 24 Hrs  T(C): 36.6 (06 Dec 2023 05:51), Max: 36.8 (05 Dec 2023 22:59)  T(F): 97.9 (06 Dec 2023 05:51), Max: 98.2 (05 Dec 2023 22:59)  HR: 77 (06 Dec 2023 05:51) (61 - 77)  BP: 112/69 (06 Dec 2023 05:51) (102/61 - 118/75)  BP(mean): --  RR: 18 (06 Dec 2023 05:51) (18 - 18)  SpO2: 99% (06 Dec 2023 05:51) (98% - 99%)    Parameters below as of 05 Dec 2023 22:59  Patient On (Oxygen Delivery Method): room air        GA: NAD, A+0 x 3  Pulm: no increased WOB  Abd: soft, nontender, nondistended, no rebound or guarding, incision clean, dry and intact, uterus firm at midline, 2 fb below umbilicus  : galaviz in situ, lochia WNL  Extremities: no swelling or calf tenderness, SCDs in place                            9.4    12.57 )-----------( 233      ( 06 Dec 2023 05:30 )             27.2               acetaminophen     Tablet .. 975 milliGRAM(s) Oral <User Schedule>  dexAMETHasone  Injectable 4 milliGRAM(s) IV Push every 6 hours PRN  diphenhydrAMINE 25 milliGRAM(s) Oral every 6 hours PRN  diphtheria/tetanus/pertussis (acellular) Vaccine (Adacel) 0.5 milliLiter(s) IntraMuscular once  enoxaparin Injectable 40 milliGRAM(s) SubCutaneous every 24 hours  ibuprofen  Tablet. 600 milliGRAM(s) Oral every 6 hours  influenza   Vaccine 0.5 milliLiter(s) IntraMuscular once  lactated ringers. 1000 milliLiter(s) IV Continuous <Continuous>  lanolin Ointment 1 Application(s) Topical every 6 hours PRN  levothyroxine 150 MICROGram(s) Oral daily  magnesium hydroxide Suspension 30 milliLiter(s) Oral two times a day PRN  metroNIDAZOLE    Tablet 500 milliGRAM(s) Oral every 12 hours  morphine PF Spinal 0.15 milliGRAM(s) IntraThecal. once  naloxone Injectable 0.1 milliGRAM(s) IV Push every 3 minutes PRN  ondansetron Injectable 4 milliGRAM(s) IV Push every 6 hours PRN  oxyCODONE    IR 5 milliGRAM(s) Oral every 3 hours PRN  oxyCODONE    IR 5 milliGRAM(s) Oral once PRN  oxytocin Infusion 333.333 milliUNIT(s)/Min IV Continuous <Continuous>  simethicone 80 milliGRAM(s) Chew every 4 hours PRN  sodium chloride 0.65% Nasal 1 Spray(s) Both Nostrils daily PRN  
Patient evaluated at bedside this morning, resting comfortable in bed.   She reports pain is well controlled with oral pain medications.   She denies headache, dizziness, chest pain, palpitations, shortness of breath, nausea, vomiting or heavy vaginal bleeding.  She has been ambulating without assistance, voiding spontaneously, passing gas, tolerating regular diet.     Physical Exam:  Vital Signs Last 24 Hrs  T(C): 36.6 (08 Dec 2023 05:30), Max: 36.7 (07 Dec 2023 22:03)  T(F): 97.8 (08 Dec 2023 05:30), Max: 98 (07 Dec 2023 22:03)  HR: 65 (08 Dec 2023 05:30) (61 - 68)  BP: 115/75 (08 Dec 2023 05:30) (115/75 - 128/80)  BP(mean): --  RR: 16 (08 Dec 2023 05:30) (16 - 18)  SpO2: 100% (08 Dec 2023 05:30) (99% - 100%)    Parameters below as of 08 Dec 2023 05:30  Patient On (Oxygen Delivery Method): room air        GA: NAD, A+0 x 3  Pulm: no increased WOB  Abd: soft, nontender, nondistended, no rebound or guarding, incision clean, dry and intact, uterus firm at midline, 2 fb below umbilicus  Extremities: no swelling or calf tenderness                   
Patient evaluated at bedside. No acute events overnight. She reports pain is well controlled with OPM. Adequate urine output. She denies headache, dizziness, chest pain, palpitations, shortness of breath, nausea, vomiting or heavy vaginal bleeding.      Physical Exam:  Vital Signs Last 24 Hrs  T(C): 36.6 (07 Dec 2023 09:53), Max: 36.8 (06 Dec 2023 13:41)  T(F): 97.9 (07 Dec 2023 09:53), Max: 98.2 (06 Dec 2023 13:41)  HR: 61 (07 Dec 2023 09:53) (61 - 72)  BP: 116/78 (07 Dec 2023 09:53) (104/74 - 128/77)  BP(mean): --  RR: 18 (07 Dec 2023 09:53) (16 - 18)  SpO2: 99% (07 Dec 2023 09:53) (98% - 100%)    Parameters below as of 07 Dec 2023 09:53  Patient On (Oxygen Delivery Method): room air        GA: NAD, A+0 x 3  Abd: + BS, soft, nontender, nondistended, no rebound or guarding, uterus firm at midline, 2 fb below umbilicus  Incision clean, dry and intact  : lochia WNL  Extremities: no  calf tenderness                            9.4    12.57 )-----------( 233      ( 06 Dec 2023 05:30 )             27.2     A/P  yo 43y s/p c/s, POD #3  ,stable    Diet: clears until flatus  Pain: OPM  IV fluid: LR @ 125cc/hr  OOB/SCDs/IS  Continue to monitor  Anticipate discharge POD #4          
Patient evaluated at bedside this morning, resting comfortable in bed, no acute events overnight. She reports pain is well-controlled.  She denies headache, dizziness, changes in vision, chest pain, palpitations, shortness of breath, RUQ pain, nausea, vomiting, fever, chills, heavy vaginal bleeding.  She has been ambulating without assistance, voiding spontaneously, tolerating food.      Physical Exam:  T(C): 36.7 (12-07-23 @ 05:51), Max: 36.7 (12-07-23 @ 05:51)  HR: 62 (12-07-23 @ 05:51) (62 - 67)  BP: 128/77 (12-07-23 @ 05:51) (104/74 - 128/77)  RR: 18 (12-07-23 @ 05:51) (18 - 18)  SpO2: 100% (12-07-23 @ 05:51) (98% - 100%)    Gen: no apparent distress  Pulm: no increased work of breathing  Abd: soft, nontender, nondistended, no rebound or guarding, uterus firm; incision clean, dry, intact  Extremities: trace pedal edema bilaterally, no calf tenderness bilaterally                          9.4    12.57 )-----------( 233      ( 06 Dec 2023 05:30 )             27.2           acetaminophen     Tablet .. 975 milliGRAM(s) Oral <User Schedule>  dexAMETHasone  Injectable 4 milliGRAM(s) IV Push every 6 hours PRN  diphenhydrAMINE 25 milliGRAM(s) Oral every 6 hours PRN  diphtheria/tetanus/pertussis (acellular) Vaccine (Adacel) 0.5 milliLiter(s) IntraMuscular once  enoxaparin Injectable 40 milliGRAM(s) SubCutaneous every 24 hours  ibuprofen  Tablet. 600 milliGRAM(s) Oral every 6 hours  influenza   Vaccine 0.5 milliLiter(s) IntraMuscular once  lactated ringers. 1000 milliLiter(s) IV Continuous <Continuous>  lanolin Ointment 1 Application(s) Topical every 6 hours PRN  levothyroxine 150 MICROGram(s) Oral daily  magnesium hydroxide Suspension 30 milliLiter(s) Oral two times a day PRN  metroNIDAZOLE    Tablet 500 milliGRAM(s) Oral every 12 hours  morphine PF Spinal 0.15 milliGRAM(s) IntraThecal. once  naloxone Injectable 0.1 milliGRAM(s) IV Push every 3 minutes PRN  ondansetron Injectable 4 milliGRAM(s) IV Push every 6 hours PRN  oxyCODONE    IR 5 milliGRAM(s) Oral once PRN  oxyCODONE    IR 5 milliGRAM(s) Oral every 3 hours PRN  oxytocin Infusion 333.333 milliUNIT(s)/Min IV Continuous <Continuous>  simethicone 80 milliGRAM(s) Chew every 4 hours PRN  sodium chloride 0.65% Nasal 1 Spray(s) Both Nostrils daily PRN  
Patient evaluated at bedside. No acute events overnight. Having gas pain, took oxycodone and simethicone with improvement. Denies n/v/f/c, cp, or dyspnea. Tolerating po. No flatus yet.      Physical Exam:  Vital Signs Last 24 Hrs  T(C): 36.6 (06 Dec 2023 05:51), Max: 36.8 (05 Dec 2023 22:59)  T(F): 97.9 (06 Dec 2023 05:51), Max: 98.2 (05 Dec 2023 22:59)  HR: 77 (06 Dec 2023 05:51) (61 - 77)  BP: 112/69 (06 Dec 2023 05:51) (102/61 - 112/69)  BP(mean): --  RR: 18 (06 Dec 2023 05:51) (18 - 18)  SpO2: 99% (06 Dec 2023 05:51) (98% - 99%)    Parameters below as of 05 Dec 2023 22:59  Patient On (Oxygen Delivery Method): room air        GA: NAD, A+0 x 3  Abd: + BS, soft, nontender, nondistended, no rebound or guarding, uterus firm at midline, 2 fb below umbilicus  Incision clean, dry and intact  : lochia WNL  Extremities: no swelling or calf tenderness                            9.4    12.57 )-----------( 233      ( 06 Dec 2023 05:30 )             27.2               
Patient evaluated at bedside. No acute events overnight. She reports pain is well controlled with OPM. Adequate urine output. Tolerated po breakfast, sitting up in chair. She denies headache, dizziness, chest pain, palpitations, shortness of breath, nausea, vomiting or heavy vaginal bleeding.      Physical Exam:  Vital Signs Last 24 Hrs  T(C): 36.6 (05 Dec 2023 06:17), Max: 37 (04 Dec 2023 21:35)  T(F): 97.8 (05 Dec 2023 06:17), Max: 98.6 (04 Dec 2023 21:35)  HR: 62 (05 Dec 2023 06:17) (60 - 83)  BP: 127/73 (05 Dec 2023 06:17) (103/57 - 127/73)  BP(mean): --  RR: 18 (05 Dec 2023 06:17) (16 - 18)  SpO2: 100% (05 Dec 2023 06:17) (94% - 100%)    Parameters below as of 04 Dec 2023 20:34  Patient On (Oxygen Delivery Method): room air        GA: NAD, A+0 x 3  Abd: + BS, soft, nontender, nondistended, no rebound or guarding, uterus firm at midline, 2 fb below umbilicus  Incision clean, dry and intact  : lochia WNL  Extremities: no swelling or calf tenderness                            10.8   11.55 )-----------( 276      ( 04 Dec 2023 21:44 )             31.6

## 2023-12-08 NOTE — DISCHARGE NOTE OB - HOSPITAL COURSE
Admitted for PROM, delivered via repeat  section.  Uncomplicated surgery and postoperative course.  Acute blood loss anemia noted on post-operative CBC.  Patient stable with normal vital signs.  No intervention necessary.

## 2023-12-08 NOTE — PROGRESS NOTE ADULT - ASSESSMENT
A/P43y s/p rpt c/s, POD #1, stable    f/u cbc  Diet: advance diet as tolerated  Pain: OPM  IV fluid: LR @ 125cc/hr  will removed galaviz in pm and do TOV  OOB/SCDs/IS  Continue to monitor  Anticipate discharge POD #3 or #4
43y Female POD#1  s/p C/S, Uncomplicated                                       - Neuro/Pain:  toradol atc, tylenol atc, oxy prn  - CV:  VS per routine, AM CBC pending  - Pulm: Encourage ISS & Ambulation  - GI: Advance as tolerated  - : Gracia in place, to be removed this morning, TOV this afternoon  - DVT ppx: SCDs, Lovenox 40mg QD  - Dispo: POD #2/3
A/P: 43y s/p  section, POD#3 w/ gHTN, stable  -  Pain: PO motrin q6hrs, tylenol q8hrs, oxycodone for severe pain PRN  -  Post-operatively labs: post-op Hgb , hemodynamically stable, no symptoms of anemia   -  GI: tolerating regular diet, passing gas  -  : s/p galaviz , urinating without difficulty  -  DVT prophylaxis: encouraged increased ambulation, SCDs, SQL  -  Dispo: POD 3 or 4    gHTN  -no toxic complaints  -normotensive
A/P  43y s/p repeat C/S, POD# 2, stable, meeting postpartum milestones   - Pain: well controlled on analgesics as above  - GI: Tolerating regular diet  - : urinating without difficulty/pain  - DVT prophylaxis: ambulating frequently  - Dispo: PPD 2, unless otherwise specified    
A/P  43y s/p rpt c/s, POD #2, meeting milestones  asymptomatic acute blood loss anemia  Diet: reg diet  Pain: OPM  IV fluid: po hydration  OOB/SCDs/IS  Continue to monitor  Anticipate discharge POD #3 or #4

## 2023-12-08 NOTE — DISCHARGE NOTE OB - NS MD DC FALL RISK RISK
For information on Fall & Injury Prevention, visit: [email or web address] OR  [email or web address] OR  https://www.cdc.gov/steadi/patient.html For information on Fall & Injury Prevention, visit: [email or web address] OR  [email or web address] OR  https://www.cdc.gov/steadi/patient.html

## 2023-12-08 NOTE — DISCHARGE NOTE OB - CARE PROVIDER_API CALL
Lyndsey Vale  Obstetrics and Gynecology  [address] [place] [address]  Phone: [phone]  Fax: [phone]  Follow Up Time: 2 weeks   Lyndsey Vale  Obstetrics and Gynecology  [address] [place] [address]  Phone: [phone]  Fax: [phone]  Follow Up Time: 2 weeks

## 2023-12-13 DIAGNOSIS — O34.211 MATERNAL CARE FOR LOW TRANSVERSE SCAR FROM PREVIOUS CESAREAN DELIVERY: ICD-10-CM

## 2023-12-13 DIAGNOSIS — A59.01 TRICHOMONAL VULVOVAGINITIS: ICD-10-CM

## 2023-12-13 DIAGNOSIS — Z28.09 IMMUNIZATION NOT CARRIED OUT BECAUSE OF OTHER CONTRAINDICATION: ICD-10-CM

## 2023-12-13 DIAGNOSIS — Z79.890 HORMONE REPLACEMENT THERAPY: ICD-10-CM

## 2023-12-13 DIAGNOSIS — Z23 ENCOUNTER FOR IMMUNIZATION: ICD-10-CM

## 2023-12-13 DIAGNOSIS — Z3A.38 38 WEEKS GESTATION OF PREGNANCY: ICD-10-CM

## 2023-12-13 DIAGNOSIS — Z28.21 IMMUNIZATION NOT CARRIED OUT BECAUSE OF PATIENT REFUSAL: ICD-10-CM

## 2023-12-13 DIAGNOSIS — E03.9 HYPOTHYROIDISM, UNSPECIFIED: ICD-10-CM

## 2023-12-21 LAB
SURGICAL PATHOLOGY STUDY: SIGNIFICANT CHANGE UP
SURGICAL PATHOLOGY STUDY: SIGNIFICANT CHANGE UP

## 2024-10-03 NOTE — ED PROVIDER NOTE - NS ED MD EM SELECTION
PROGRESS NOTE    HISTORY:Isaiah Christianson is a 59 year old female presenting for left knee follow-up.  Felt well for several days following the injection.  Pain is returned, medial and posterior aspect of the knee.    Current Outpatient Medications   Medication Sig    semaglutide,0.25 or 0.5 mg/DOSE, (Ozempic, 0.25 or 0.5 MG/DOSE,) (0.68 mg/mL) injection INJECT UNDER THE SKIN 0.25MG ONCE EVERY 7 DAYS    amphetamine-dextroamphetamine (Adderall) 30 MG tablet Take 1 tablet by mouth daily.    amphetamine-dextroamphetamine (Adderall) 30 MG tablet Take 1 tablet by mouth daily.    DULoxetine (CYMBALTA) 60 MG capsule Take 1 capsule by mouth daily.    hydrOXYzine (VISTARIL) 25 MG capsule Take 1 capsule by mouth 3 times daily as needed for Itching.    atorvastatin (Lipitor) 40 MG tablet Take 1 tablet by mouth daily.    baclofen (LIORESAL) 10 MG tablet Take 1 tablet by mouth nightly.    diclofenac (VOLTAREN) 1 % gel Apply 4 g topically to affected area 4 times daily as needed for pain.    empagliflozin (Jardiance) 10 MG tablet Take 1 tablet by mouth daily (before breakfast).    glimepiride (AMARYL) 4 MG tablet Take 1 tablet by mouth daily (before breakfast).    ondansetron (ZOFRAN ODT) 4 MG disintegrating tablet Place 1 tablet onto the tongue every 6 hours.    lisinopril (ZESTRIL) 10 MG tablet TAKE 1 TABLET BY MOUTH DAILY    metFORMIN (GLUCOPHAGE) 500 MG tablet TAKE 2 TABLETS BY MOUTH IN THE MORNING AND AT NIGHT    meloxicam (MOBIC) 15 MG tablet TAKE 1 TABLET BY MOUTH DAILY WITH FOOD. STOP IF CAUSES STOMACH UPSET    famotidine (PEPCID) 20 MG tablet TAKE 1 TABLET BY MOUTH IN THE MORNING AND IN THE EVENING    omeprazole (PrilOSEC) 20 MG capsule TAKE 1 CAPSULE BY MOUTH DAILY    semaglutide,0.25 or 0.5 mg/DOSE, (OZEMPIC) (1.34 mg/ml) injection Inject 0.5 mg into the skin every 7 days. Indications: Type 2 Diabetes    Lidocaine 4 % Gel Apply 1 application topically 3 times daily.    pioglitazone (ACTOS) 30 MG tablet Take 1 tablet  by mouth daily. No further refills.  Need to see MD.  Please call to schedule appointment.    Hydrocortisone Acetate (NuCort) 2 % Lotion Use to affected arms tid prn itch.    calcium carbonate-vitamin D (CALTRATE+D) 600-400 MG-UNIT per tablet Take 1 tablet by mouth 3 times daily (with meals).    Blood Glucose Monitoring Suppl w/Device Kit Check BS once a day    blood glucose test strip Test blood sugar one  times daily. Diagnosis: DM . Meter: glucometer    Lancets Misc Check BS once a day    aspirin 81 MG EC tablet Take 1 tablet by mouth daily.    pantoprazole (PROTONIX) 40 MG tablet Take 1 tablet by mouth daily (before breakfast).     No current facility-administered medications for this visit.     ALLERGIES:  No Known Allergies    PHYSICAL EXAM:   GENERAL:  The patient is well groomed, well developed, well nourished female in  no apparent distress, alert, oriented x 3, and with normal mood and affect.    Musculoskeletal:  In no distress.  Normal gait.  Knee shows no effusion.  Range 0 to about 125.  Joint line tenderness is noted    Imaging: No new    ASSESSMENT/DIAGNOSIS: Persistent left knee pain, transient relief following the injection, mild degenerative characteristic on x-ray    PLAN: Discussed options and at this point we will proceed with MRI to further characterize extent of DJD, meniscal pathology.  Can use some light exercises.  Will be in touch once MRI is available to discuss findings and options going forward.    EDUCATIONAL MATERIAL GIVEN: <YES>         33852 Exp Problem Focused - Low Complex

## 2024-12-11 NOTE — PRE-ANESTHESIA EVALUATION ADULT - NSANTHOSAYNRD_GEN_A_CORE
#Discharge: do not delete    Patient is __ yo M/F with past medical history of _____     Presented with _____, found to have _____    Inpatient treatment course:    Problem List/Main Diagnoses (system-based):         Patient was discharged to: (home/[facility]/acute rehab/hospice, etc, and with what services – home health PT/RN? Home O2?)  New medications:  Changes to old medications:  Medications that were stopped:   Items to follow up as outpatient:  Physical exam at the time of discharge: #Discharge: do not delete    Patient is a 43F w/ PMHx of HFrEF (EF 45-50% 07/2024) 2/2 NICM (s/p PCI w/ LI x1 to LAD in 02/2024), SLE (on Plaquenil), chronic DAVID 2/2 heavy menses, spinal stenosis, and GERD and no prior history of asthma/COPD presents with two day history of worsening episodic dyspnea on exertion>at rest following probably viral illness associated with subjective fevers, chills and diarrhea. Given elevated d-dimer CTPE performed showing No central pulmonary embolism, patchy GGO in left lower lobe. . TTE ordered to assess for possible pulmonary HTN. Cannot exclude vasculitis given autoimmune condition although no cutaneous, renal or neurological manifestations .TTE pending read, pulm consulted c/f possible vasculitis and further work up. Restarted GDMT 12/10. Urine legionella and strep negative.       Problem List/Main Diagnoses (system-based):  #Dyspnea.   Patient with no prior history of asthma/COPD presents with two day history of worsening episodic dyspnea on exertion>at rest following probably viral illness associated with subjective fevers, chills and diarrhea.Working differential include dyspnea iso blood loss anemia 2/2 heavy menses, though last menstrual period ended 11/20/24. JOLENE and deconditioning from recent illness possible contributors. Given h/o SLE and elevated d-dimer, CTPE performed though BNP, trops, and HR wnl, satting well on RA.  PNA less likely given absent fevers and nl WBC, though w/ ground glass opacities on CT. TTE ordered to assess for possible pulmonary HTN given risk factors and hoarsness noted during interview. Cannot exclude vasculitis given autoimmune condition although no cutaneous, renal or neurological manifestations.   CXR: LLL consolidation with R focal atelectasis; CT PE: No central pulmonary embolism, patchy GGO in left lower lobe  Urine Legionella and Strep negative. TTE negative. No PT needs.   - Anemia workup as below  - As per pulm recs: CTX 1g and azithromycin 500 mg x 5-7 days for CAP coverage iso unknown cause for consolidation  - Pending sputum cx    #Heart failure with mildly reduced ejection fraction (HFmrEF).   2/2 ICM (s/p PCI w/ LI x1 to pLAD in 02/2024). Holding home GDMT for hypotension  07/2024 TTE: LVEF 45-50%, apical hypokinesis, RV function intact, atria nl, no valvular dz.  Home medications: ASA qd, Brilinta 60 BID, atorvastatin 80 qHS, spironolactone 25 qd, Entresto 24-26 BID  - C/w home meds with strict holding parameters   - Holding home Lopressor 25 BID for now   - Holding home Farxiga 10mg qd    #H/O systemic lupus erythematosus (SLE).   Follows w/ [facility]ologist at [facility]  Plan:  - C/w home Plaquenil 200mg q12h.    #Chronic anemia.   Likely Mixed DAVID and AoCD ISO CHF and chronic heavy menses | Iron studies 12/09/24: Iron sat 12, Ferritin 377. Will hold off iron supplementation due to high ferritin and c/f iron overload  Plan:  - Closely trend HGB  - Transfuse for HGB <7.0  - Ensure 2 large-bore IVs  - Maintain active T&S.    Patient was discharged to: home  New medications: azithromycin 500 mg, cefpodoxime?   Changes to old medications: --  Medications that were stopped: --  Items to follow up as outpatient: pulmonology, primary care   Physical exam at the time of discharge: AAOx3, NAD, RRR, LCTAB, abd soft/NT/ND, ext: WWP, NAD #Discharge: do not delete    Patient is a 43F w/ PMHx of HFrEF (EF 45-50% 07/2024) 2/2 NICM (s/p PCI w/ LI x1 to LAD in 02/2024), SLE (on Plaquenil), chronic DAVID 2/2 heavy menses, spinal stenosis, and GERD and no prior history of asthma/COPD presents with two day history of worsening episodic dyspnea on exertion>at rest following probably viral illness associated with subjective fevers, chills and diarrhea. Given elevated d-dimer CTPE performed showing No central pulmonary embolism, patchy GGO in left lower lobe. . TTE ordered to assess for possible pulmonary HTN. Cannot exclude vasculitis given autoimmune condition although no cutaneous, renal or neurological manifestations .TTE pending read, pulm consulted c/f possible vasculitis and further work up. Restarted GDMT 12/10. Urine legionella and strep negative.       Problem List/Main Diagnoses (system-based):  #Dyspnea.   Patient with no prior history of asthma/COPD presents with two day history of worsening episodic dyspnea on exertion>at rest following probably viral illness associated with subjective fevers, chills and diarrhea.Working differential include dyspnea iso blood loss anemia 2/2 heavy menses, though last menstrual period ended 11/20/24. JOLENE and deconditioning from recent illness possible contributors. Given h/o SLE and elevated d-dimer, CTPE performed though BNP, trops, and HR wnl, satting well on RA.  PNA less likely given absent fevers and nl WBC, though w/ ground glass opacities on CT. TTE ordered to assess for possible pulmonary HTN given risk factors and hoarseness noted during interview. Cannot exclude vasculitis given autoimmune condition although no cutaneous, renal or neurological manifestations.   CXR: LLL consolidation with R focal atelectasis; CT PE: No central pulmonary embolism, patchy GGO in left lower lobe  Urine Legionella and Strep negative. TTE negative. No PT needs.   - f/u sputum cx  - C/w CTX 1g + azithromycin 500 mg for CAP coverage   -----will be discharged with cefpodoxime 200 mg BID x 5-7 days (12/10-12/14) AND azithromycin 500 mg x 5 days (12/10-12/14)  - Anemia workup as below  - f/u with pulmonology outpatient within 4-6 weeks post-discharge    #Heart failure with mildly reduced ejection fraction (HFmrEF).   2/2 ICM (s/p PCI w/ LI x1 to pLAD in 02/2024). Holding home GDMT for hypotension  07/2024 TTE: LVEF 45-50%, apical hypokinesis, RV function intact, atria nl, no valvular dz.  Home medications: ASA qd, Brilinta 60 BID, atorvastatin 80 qHS, spironolactone 25 qd, Entresto 24-26 BID  - C/w home meds with strict holding parameters   - Holding home Lopressor 25 BID for now   - Holding home Farxiga 10mg qd    #H/O systemic lupus erythematosus (SLE).   Follows w/ rheumatologist at [facility]  Plan:  - C/w home Plaquenil 200mg q12h.    #Chronic anemia.   Likely Mixed DAVID and AoCD ISO CHF and chronic heavy menses | Iron studies 12/09/24: Iron sat 12, Ferritin 377. Will hold off iron supplementation due to high ferritin and c/f iron overload  Plan:  - Closely trend HGB  - Transfuse for HGB <7.0  - Ensure 2 large-bore IVs  - Maintain active T&S.    Patient was discharged to: home  New medications: azithromycin 500 mg qd, cefpodoxime 200 mg BID   Changes to old medications: --  Medications that were stopped: --  Items to follow up as outpatient: pulmonology, primary care   Physical exam at the time of discharge: AAOx3, NAD, RRR, LCTAB, abd soft/NT/ND, ext: WWP, NAD No. JACKELYN screening performed.  STOP BANG Legend: 0-2 = LOW Risk; 3-4 = INTERMEDIATE Risk; 5-8 = HIGH Risk